# Patient Record
Sex: FEMALE | Race: WHITE | NOT HISPANIC OR LATINO
[De-identification: names, ages, dates, MRNs, and addresses within clinical notes are randomized per-mention and may not be internally consistent; named-entity substitution may affect disease eponyms.]

---

## 2018-08-28 PROBLEM — Z00.00 ENCOUNTER FOR PREVENTIVE HEALTH EXAMINATION: Status: ACTIVE | Noted: 2018-08-28

## 2018-08-29 ENCOUNTER — APPOINTMENT (OUTPATIENT)
Dept: BREAST CENTER | Facility: CLINIC | Age: 53
End: 2018-08-29
Payer: COMMERCIAL

## 2018-08-29 VITALS
BODY MASS INDEX: 40.57 KG/M2 | DIASTOLIC BLOOD PRESSURE: 84 MMHG | SYSTOLIC BLOOD PRESSURE: 138 MMHG | HEIGHT: 63 IN | WEIGHT: 229 LBS

## 2018-08-29 DIAGNOSIS — Z85.3 PERSONAL HISTORY OF MALIGNANT NEOPLASM OF BREAST: ICD-10-CM

## 2018-08-29 DIAGNOSIS — Z78.9 OTHER SPECIFIED HEALTH STATUS: ICD-10-CM

## 2018-08-29 DIAGNOSIS — L03.114 CELLULITIS OF LEFT UPPER LIMB: ICD-10-CM

## 2018-08-29 DIAGNOSIS — Z82.49 FAMILY HISTORY OF ISCHEMIC HEART DISEASE AND OTHER DISEASES OF THE CIRCULATORY SYSTEM: ICD-10-CM

## 2018-08-29 DIAGNOSIS — Z86.711 PERSONAL HISTORY OF PULMONARY EMBOLISM: ICD-10-CM

## 2018-08-29 DIAGNOSIS — Z86.718 PERSONAL HISTORY OF OTHER VENOUS THROMBOSIS AND EMBOLISM: ICD-10-CM

## 2018-08-29 DIAGNOSIS — Z83.3 FAMILY HISTORY OF DIABETES MELLITUS: ICD-10-CM

## 2018-08-29 DIAGNOSIS — N61.0 MASTITIS WITHOUT ABSCESS: ICD-10-CM

## 2018-08-29 DIAGNOSIS — Z80.3 FAMILY HISTORY OF MALIGNANT NEOPLASM OF BREAST: ICD-10-CM

## 2018-08-29 DIAGNOSIS — F17.200 NICOTINE DEPENDENCE, UNSPECIFIED, UNCOMPLICATED: ICD-10-CM

## 2018-08-29 PROCEDURE — 99244 OFF/OP CNSLTJ NEW/EST MOD 40: CPT

## 2018-08-29 RX ORDER — OXYCODONE 5 MG/1
5 TABLET ORAL
Qty: 20 | Refills: 0 | Status: DISCONTINUED | COMMUNITY
Start: 2018-03-15 | End: 2018-08-29

## 2018-08-29 RX ORDER — ANASTROZOLE TABLETS 1 MG/1
1 TABLET ORAL
Refills: 0 | Status: ACTIVE | COMMUNITY

## 2018-08-29 RX ORDER — UBIDECARENONE/VIT E ACET 100MG-5
50 MCG CAPSULE ORAL
Refills: 0 | Status: ACTIVE | COMMUNITY

## 2018-08-29 RX ORDER — WARFARIN SODIUM 2 MG/1
2 TABLET ORAL
Refills: 0 | Status: ACTIVE | COMMUNITY

## 2018-08-29 RX ORDER — WARFARIN 7.5 MG/1
7.5 TABLET ORAL
Qty: 30 | Refills: 0 | Status: DISCONTINUED | COMMUNITY
Start: 2018-04-27 | End: 2018-08-29

## 2018-08-29 RX ORDER — WARFARIN 10 MG/1
10 TABLET ORAL
Qty: 30 | Refills: 0 | Status: DISCONTINUED | COMMUNITY
Start: 2018-04-27 | End: 2018-08-29

## 2018-08-29 RX ORDER — WARFARIN 5 MG/1
5 TABLET ORAL
Qty: 30 | Refills: 0 | Status: DISCONTINUED | COMMUNITY
Start: 2018-03-05 | End: 2018-08-29

## 2018-09-12 DIAGNOSIS — Z13.79 ENCOUNTER FOR OTHER SCREENING FOR GENETIC AND CHROMOSOMAL ANOMALIES: ICD-10-CM

## 2019-03-07 ENCOUNTER — APPOINTMENT (OUTPATIENT)
Dept: BREAST CENTER | Facility: CLINIC | Age: 54
End: 2019-03-07
Payer: COMMERCIAL

## 2019-03-07 VITALS
SYSTOLIC BLOOD PRESSURE: 160 MMHG | WEIGHT: 206 LBS | BODY MASS INDEX: 36.5 KG/M2 | DIASTOLIC BLOOD PRESSURE: 84 MMHG | HEIGHT: 63 IN | HEART RATE: 55 BPM

## 2019-03-07 DIAGNOSIS — R22.30 LOCALIZED SWELLING, MASS AND LUMP, UNSPECIFIED UPPER LIMB: ICD-10-CM

## 2019-03-07 DIAGNOSIS — Z85.3 PERSONAL HISTORY OF MALIGNANT NEOPLASM OF BREAST: ICD-10-CM

## 2019-03-07 PROCEDURE — 99214 OFFICE O/P EST MOD 30 MIN: CPT

## 2019-03-07 NOTE — DATA REVIEWED
[FreeTextEntry1] : COLOR panel test, report 9/30/2018\par - no mutations\par \par Needle bx 10/4/2010\par - infiltrating ductal carcinoma, DCIS, ALH, LCIS, ER 80%, MI 90%, Her2 FISH 1.3\par \par Surg path, result 1/20/2011\par - L breast - 4.2 cm tumor, EIC, Jd 5/9\par - 1/1 SLN up to 1.4 cm with extranodal extension; additional 3/16 (2 with macromet, 1 with micromet); total 4/17 \par \par R mammogram 5/14/2018\par - predominantly fatty\par - retroglandular silicone implant\par - BIRADS 1\par \par R complete US 5/14/2018\par - BIRADS 1\par \par L breast US 9/4/2018\par - 2.5 x 1.4 x 0.9 cm anechoic structure at area of concern in L breast 3:00 N11, possible fat necrosis or seroma\par - adjacent 3.2 x 3.1 cm heterogenous finding with shadowing at area of concern in L breast 3:00 N11, possible fat necrosis or scar tissue \par - vague heterogenous finding at area of concern in L breast 3:00 N10, possible scar tissue\par - no axillary lymphadenopathy\par - BIRADS 0; breast MRI recommended\par \par Breast MRI 9/7/2018\par - 0.9 and 0.7 cm adjacent enhancing masses in R posterior central and lower breast, likely stable from 2016; 6 mo MRI recommended\par - R implant intact\par - 4.1 x 3.3 cm fat necrosis at area of concern in L breast 3:00\par - no axillary adenopathy\par - BIRADS 3

## 2019-03-07 NOTE — CONSULT LETTER
[Dear  ___] : Dear  [unfilled], [Courtesy Letter:] : I had the pleasure of seeing your patient, [unfilled], in my office today. [Please see my note below.] : Please see my note below. [Consult Closing:] : Thank you very much for allowing me to participate in the care of this patient.  If you have any questions, please do not hesitate to contact me. [Sincerely,] : Sincerely, [FreeTextEntry3] : Madelin Reed MD  [DrJordon  ___] : Dr. LEES

## 2019-03-07 NOTE — PAST MEDICAL HISTORY
[Menarche Age ____] : age at menarche was [unfilled] [Total Preg ___] : G[unfilled] [Live Births ___] : P[unfilled]  [Age At Live Birth ___] : Age at live birth: [unfilled] [History of Hormone Replacement Treatment] : has no history of hormone replacement treatment [de-identified] : No menses after chemo at age 46. [FreeTextEntry7] : 20 yr [FreeTextEntry8] : no

## 2019-03-07 NOTE — HISTORY OF PRESENT ILLNESS
[FreeTextEntry1] : Ms. Goins is a 54 year old woman who presents for a follow-up for a right axillary lump, and abnormal findings in her right breast on MRI. Her breast history is significant for:\par \par 1. Left breast infiltrating ductal carcinoma diagnosed at age 45 in 2010, pathologic stage III A, pT2 N2a, Jd 5/9, ER 80%, CO 90%, Her2 FISH 1.3\par - s/p L mastectomy, sentinel node biopsy, axillary node dissection (Dr. Zimmer, 1/2011) with TRAM reconstruction = 4.2 cm tumor, 4/17 LN up to 1.4 cm with extranodal extension\par - s/p adjuvant chemotherapy\par - s/p adjuvant radiation\par - tamoxifen x 5 yr, then had PE / DVT and switched to anastrazole which she is currently on\par \par 2. BRCA testing and genetic panel testing are negative. \par \par 3. History of b/l breast reduction prior to her L breast cancer, then a right augmentation with implant after her L mastectomy \par \par 4. Left arm lymphedema \par \par A lump in the far lateral left reconstructed breast was palpated at the last visit, and left ultrasound on 9/4/2018 suggested fat necrosis, seroma or scar tissue, and MRI on 9/7/2018 showed stable fat necrosis at the area of concern, and also two adjacent enhancing nodules in the right breast. She has no breast complaints. No palpable breast or axillary lumps, nipple discharge, or skin changes. The right axillary dermal lump disappeared altogether. She met a Plastic Surgeon regarding her left arm lymphedema and was told she had to lose 100 points; she has lost 57 pounds so far, and plans to lose 40 more. \par \par Her family history is significant for breast cancer in two paternal cousins, both at 41. One of the cousins is the daughter of an affected paternal aunt with appendiceal cancer, and had BRCA testing which was negative. The other paternal cousin is the daughter of a different paternal aunt.

## 2019-03-07 NOTE — PHYSICAL EXAM
[Normocephalic] : normocephalic [Atraumatic] : atraumatic [EOMI] : extra ocular movement intact [Sclera nonicteric] : sclera nonicteric [Supple] : supple [No Supraclavicular Adenopathy] : no supraclavicular adenopathy [No Cervical Adenopathy] : no cervical adenopathy [Clear to Auscultation Bilat] : clear to auscultation bilaterally [Normal Sinus Rhythm] : normal sinus rhythm [No Rubs] : no pericardial rub [Examined in the supine and seated position] : examined in the supine and seated position [Asymmetrical] : asymmetrical [Bra Size: ___] : Bra Size: [unfilled] [No dominant masses] : no dominant masses in right breast  [No Nipple Retraction] : no left nipple retraction [No Nipple Discharge] : no left nipple discharge [No Axillary Lymphadenopathy] : no left axillary lymphadenopathy [Soft] : abdomen soft [Not Tender] : non-tender [No Edema] : no edema [No Rashes] : no rashes [No Ulceration] : no ulceration [de-identified] : R >> L [de-identified] : Circumareolar scar and inframammary scar. Implant.  [de-identified] : Post-radiation changes. Keyhole-shaped TRAM flap skin paddle. Lump in far medial aspect.  [de-identified] : MARILIN in sleeve for lymphedema

## 2020-08-24 ENCOUNTER — APPOINTMENT (OUTPATIENT)
Dept: BREAST CENTER | Facility: CLINIC | Age: 55
End: 2020-08-24

## 2020-08-28 ENCOUNTER — APPOINTMENT (OUTPATIENT)
Dept: BREAST CENTER | Facility: CLINIC | Age: 55
End: 2020-08-28
Payer: COMMERCIAL

## 2020-08-28 VITALS
HEART RATE: 64 BPM | HEIGHT: 63 IN | SYSTOLIC BLOOD PRESSURE: 112 MMHG | BODY MASS INDEX: 32.43 KG/M2 | DIASTOLIC BLOOD PRESSURE: 76 MMHG | WEIGHT: 183 LBS

## 2020-08-28 DIAGNOSIS — N63.20 UNSPECIFIED LUMP IN THE LEFT BREAST, UNSPECIFIED QUADRANT: ICD-10-CM

## 2020-08-28 PROCEDURE — 99213 OFFICE O/P EST LOW 20 MIN: CPT

## 2020-08-28 NOTE — HISTORY OF PRESENT ILLNESS
[FreeTextEntry1] : Ms. Goins is a 54 year old woman last seen in the office approximately 18 months ago for f/u for a right axillary lump, and abnormal findings in her right breast on MRI. Her breast history is significant for:\par \par 1. Left breast infiltrating ductal carcinoma diagnosed at age 45 in 2010, pathologic stage III A, pT2 N2a, Jd 5/9, ER 80%, TN 90%, Her2 FISH 1.3\par - s/p L mastectomy, sentinel node biopsy, axillary node dissection (Dr. Zimmer, 1/2011) with TRAM reconstruction = 4.2 cm tumor, 4/17 LN up to 1.4 cm with extranodal extension\par - s/p adjuvant chemotherapy\par - s/p adjuvant radiation\par - tamoxifen x 5 yr, then had PE / DVT and switched to anastrazole which she is currently on\par \par 2. BRCA testing and genetic panel testing are negative. \par \par 3. History of b/l breast reduction prior to her L breast cancer, then a right augmentation with implant after her L mastectomy \par \par 4. Left arm lymphedema \par \par She is here today with c/o possible  worsening of lymphedema in back "it feels a bit funny" and possible worsening of lymphedema in LUE. She also c/o possible increase in size of previously palpated hardened lump in left lateral reconstructed breast. She has met with a  plastic surgeon regarding her left arm lymphedema and was told she had to lose 100 pounds prior to surgery  she has lost 80 pounds thus far, and had noted some improvement in upper extremity lymphedema.  She does note that she has not been using pump or performing her home exercises for the treatment of lymphedema.   Prior to her weight loss she notes multiple episodes of cellulitis in LUE which has not recurred .\par \par She has been wearing compression sleeve but notes that it has been some time since she has obtained a new sleeve.  She also has not been seen by physical therapy for treatment of her lymphedema in some time.  \par \par she denies fevers , chills , redness of skin on UE or any other signs of infection.\par \par

## 2020-08-28 NOTE — PAST MEDICAL HISTORY
[Menarche Age ____] : age at menarche was [unfilled] [Total Preg ___] : G[unfilled] [Live Births ___] : P[unfilled]  [Age At Live Birth ___] : Age at live birth: [unfilled] [History of Hormone Replacement Treatment] : has no history of hormone replacement treatment [FreeTextEntry8] : no [de-identified] : No menses after chemo at age 46. [FreeTextEntry7] : 20 yr

## 2020-08-28 NOTE — PHYSICAL EXAM
[Sclera nonicteric] : sclera nonicteric [Normocephalic] : normocephalic [Atraumatic] : atraumatic [No Axillary Lymphadenopathy] : no left axillary lymphadenopathy [Asymmetrical] : asymmetrical [No Edema] : no edema [No Rashes] : no rashes [de-identified] : compression sleeve in place, + lymphadema [de-identified] : r [de-identified] : post - radiation changes , hardened lump in left lateral aspect of breast -

## 2020-12-02 ENCOUNTER — APPOINTMENT (OUTPATIENT)
Dept: BREAST CENTER | Facility: CLINIC | Age: 55
End: 2020-12-02

## 2021-01-08 ENCOUNTER — APPOINTMENT (OUTPATIENT)
Dept: BREAST CENTER | Facility: CLINIC | Age: 56
End: 2021-01-08
Payer: COMMERCIAL

## 2021-01-08 VITALS
BODY MASS INDEX: 33.66 KG/M2 | OXYGEN SATURATION: 99 % | SYSTOLIC BLOOD PRESSURE: 144 MMHG | HEIGHT: 63 IN | WEIGHT: 190 LBS | DIASTOLIC BLOOD PRESSURE: 87 MMHG | HEART RATE: 56 BPM

## 2021-01-08 DIAGNOSIS — N64.89 OTHER SPECIFIED DISORDERS OF BREAST: ICD-10-CM

## 2021-01-08 DIAGNOSIS — R92.8 OTHER ABNORMAL AND INCONCLUSIVE FINDINGS ON DIAGNOSTIC IMAGING OF BREAST: ICD-10-CM

## 2021-01-08 DIAGNOSIS — Z12.39 ENCOUNTER FOR OTHER SCREENING FOR MALIGNANT NEOPLASM OF BREAST: ICD-10-CM

## 2021-01-08 DIAGNOSIS — Z08 ENCOUNTER FOR FOLLOW-UP EXAMINATION AFTER COMPLETED TREATMENT FOR MALIGNANT NEOPLASM: ICD-10-CM

## 2021-01-08 DIAGNOSIS — I89.0 LYMPHEDEMA, NOT ELSEWHERE CLASSIFIED: ICD-10-CM

## 2021-01-08 DIAGNOSIS — Z85.3 ENCOUNTER FOR FOLLOW-UP EXAMINATION AFTER COMPLETED TREATMENT FOR MALIGNANT NEOPLASM: ICD-10-CM

## 2021-01-08 PROCEDURE — 99072 ADDL SUPL MATRL&STAF TM PHE: CPT

## 2021-01-08 PROCEDURE — 99214 OFFICE O/P EST MOD 30 MIN: CPT

## 2021-01-08 NOTE — DATA REVIEWED
[FreeTextEntry1] : COLOR panel test, report 9/30/2018\par - no mutations\par \par Needle bx 10/4/2010\par - infiltrating ductal carcinoma, DCIS, ALH, LCIS, ER 80%, SC 90%, Her2 FISH 1.3\par \par Surg path, result 1/20/2011\par - L breast - 4.2 cm tumor, EIC, Jd 5/9\par - 1/1 SLN up to 1.4 cm with extranodal extension; additional 3/16 (2 with macromet, 1 with micromet); total 4/17 \par \par Breast MRI 4/25/2019\par - intact R implant\par - postsurgical changes\par - low density in R central lower posterior breast next to implant, unchanged, favoring benign etiology\par - BIRADS 2\par \par R mammogram 5/18/20\par - predominantly fatty\par - BIRADS 1\par \par R complete US 5/18/20\par - BIRADS 1\par \par L complete US 12/10/20\par - 3.9 x 2.7 x 3.5 cm shadowing calcification in L breast 3:00 N9 at area of concern, likely from fat necrosis\par - 0.8 x 1.2 x 1 cm shadowing calcification in L breast 6:00 N6, likely from fat necrosis\par - BIRADS 2

## 2021-01-08 NOTE — HISTORY OF PRESENT ILLNESS
[FreeTextEntry1] : Ms. Goins is a 55 year old woman who presents for a follow-up for surveillance for left breast cancer. Her breast history is significant for:\par \par 1. Left breast infiltrating ductal carcinoma diagnosed at age 45 in 2010, pathologic stage III A, pT2 N2a, [place] 5/9, ER 80%, [place] 90%, Her2 FISH 1.3\par - s/p L mastectomy, sentinel node biopsy, axillary node dissection (Dr. Zimmer, 1/2011) with TRAM reconstruction = 4.2 cm tumor, 4/17 LN up to 1.4 cm with extranodal extension\par - s/p adjuvant chemotherapy\par - s/p adjuvant radiation\par - tamoxifen x 5 yr, then had PE / DVT and switched to anastrazole which she is currently on\par \par 2. History of b/l breast reduction prior to her L breast cancer, then a right augmentation with implant after her L mastectomy \par \par 3. Left arm lymphedema \par \par She is doing well. The lump of fat necrosis in the left lateral breast is the same. No other lumps. No nipple discharge or skin changes. She lost 70 pounds from her weight in 2019 and hopes to lose another 30. \par \par Her BRCA and genetic panel testing are negative. Her family history is significant for breast cancer in two paternal cousins, both at 41. One of the cousins is the daughter of an affected paternal aunt with appendiceal cancer, and had BRCA testing which was negative. The other paternal cousin is the daughter of a different paternal aunt.

## 2021-01-08 NOTE — PHYSICAL EXAM
[Normocephalic] : normocephalic [Atraumatic] : atraumatic [EOMI] : extra ocular movement intact [Sclera nonicteric] : sclera nonicteric [Supple] : supple [No Supraclavicular Adenopathy] : no supraclavicular adenopathy [No Cervical Adenopathy] : no cervical adenopathy [Clear to Auscultation Bilat] : clear to auscultation bilaterally [Normal Sinus Rhythm] : normal sinus rhythm [No Rubs] : no pericardial rub [Examined in the supine and seated position] : examined in the supine and seated position [Asymmetrical] : asymmetrical [Bra Size: ___] : Bra Size: [unfilled] [No dominant masses] : no dominant masses in right breast  [No Nipple Retraction] : no left nipple retraction [No Nipple Discharge] : no left nipple discharge [No Axillary Lymphadenopathy] : no left axillary lymphadenopathy [Soft] : abdomen soft [Not Tender] : non-tender [No Edema] : no edema [No Rashes] : no rashes [No Ulceration] : no ulceration [de-identified] : R >> L [de-identified] : Circumareolar scar and inframammary scar. Implant in place [de-identified] : Post-radiation changes. Keyhole-shaped TRAM flap skin paddle. Hard lump in far lateral aspect at area of fat necrosis [de-identified] : MARILIN in sleeve for lymphedema

## 2021-01-08 NOTE — CONSULT LETTER
[Dear  ___] : Dear  [unfilled], [Courtesy Letter:] : I had the pleasure of seeing your patient, [unfilled], in my office today. [Please see my note below.] : Please see my note below. [Consult Closing:] : Thank you very much for allowing me to participate in the care of this patient.  If you have any questions, please do not hesitate to contact me. [Sincerely,] : Sincerely, [DrJordon  ___] : Dr. LEES [FreeTextEntry3] : Madelin Reed MD

## 2021-01-08 NOTE — PAST MEDICAL HISTORY
[Menarche Age ____] : age at menarche was [unfilled] [Total Preg ___] : G[unfilled] [Live Births ___] : P[unfilled]  [Age At Live Birth ___] : Age at live birth: [unfilled] [History of Hormone Replacement Treatment] : has no history of hormone replacement treatment [de-identified] : No menses after chemo at age 46. [FreeTextEntry7] : 20 yr [FreeTextEntry8] : no

## 2021-01-30 ENCOUNTER — EMERGENCY (EMERGENCY)
Facility: HOSPITAL | Age: 56
LOS: 1 days | Discharge: HOME | End: 2021-01-30
Admitting: EMERGENCY MEDICINE
Payer: COMMERCIAL

## 2021-01-30 VITALS
DIASTOLIC BLOOD PRESSURE: 98 MMHG | OXYGEN SATURATION: 98 % | RESPIRATION RATE: 16 BRPM | HEART RATE: 71 BPM | TEMPERATURE: 98 F | SYSTOLIC BLOOD PRESSURE: 147 MMHG

## 2021-01-30 PROCEDURE — U0005: CPT

## 2021-01-30 PROCEDURE — 99283 EMERGENCY DEPT VISIT LOW MDM: CPT

## 2021-01-30 PROCEDURE — 99282 EMERGENCY DEPT VISIT SF MDM: CPT

## 2021-01-30 PROCEDURE — U0003: CPT

## 2021-01-30 NOTE — ED ADULT TRIAGE NOTE - CHIEF COMPLAINT QUOTE
pt. states 2 of her colleagues tested covid this week, pt. presently denies symptoms at the present time.

## 2021-01-30 NOTE — ED PROVIDER NOTE - CARDIAC, MLM
Normal rate, regular rhythm.  Heart sounds S1, S2.  No murmurs, rubs or gallops.
Principal Discharge DX:	Muscle spasm

## 2021-01-30 NOTE — ED PROVIDER NOTE - OBJECTIVE STATEMENT
54 y/o F presents to ED for COVID testing. Pt currently asymptomatic. No chest pain, sob, cough, abd pain, n/v/d, headache, dizziness. + sick contacts at work. No recent travel. Pt well appearing. NAD.

## 2021-01-30 NOTE — ED PROVIDER NOTE - PATIENT PORTAL LINK FT
You can access the FollowMyHealth Patient Portal offered by [facility] by registering at the following website: [email or web address]. By joining [employer]’s FollowMyHealth portal, you will also be able to view your health information using other applications (apps) compatible with our system.

## 2021-02-10 DIAGNOSIS — Z90.12 ACQUIRED ABSENCE OF LEFT BREAST AND NIPPLE: ICD-10-CM

## 2021-03-23 ENCOUNTER — EMERGENCY (EMERGENCY)
Facility: HOSPITAL | Age: 56
LOS: 1 days | End: 2021-03-23
Payer: COMMERCIAL

## 2021-03-23 VITALS
RESPIRATION RATE: 20 BRPM | SYSTOLIC BLOOD PRESSURE: 170 MMHG | OXYGEN SATURATION: 97 % | HEART RATE: 62 BPM | DIASTOLIC BLOOD PRESSURE: 90 MMHG | TEMPERATURE: 98 F

## 2021-03-23 PROBLEM — Z78.9 OTHER SPECIFIED HEALTH STATUS: Chronic | Status: ACTIVE | Noted: 2021-01-30

## 2021-03-23 LAB — SARS-COV-2 RNA SPEC QL NAA+PROBE: SIGNIFICANT CHANGE UP

## 2021-03-23 PROCEDURE — 99283 EMERGENCY DEPT VISIT LOW MDM: CPT

## 2021-03-23 PROCEDURE — U0005: CPT

## 2021-03-23 PROCEDURE — 99282 EMERGENCY DEPT VISIT SF MDM: CPT

## 2021-03-23 PROCEDURE — U0003: CPT

## 2021-03-23 NOTE — ED PROVIDER NOTE - PATIENT PORTAL LINK FT
You can access the FollowMyHealth Patient Portal offered by [facility] by registering at the following website: [email or web address]. By joining [employer]’s FollowMyHealth portal, you will also be able to view your health information using other applications (apps) compatible with our system.

## 2021-03-23 NOTE — ED PROVIDER NOTE - OBJECTIVE STATEMENT
55yo F presenting for covid testing. Pt had positive exposure at work. Pt feeling well, no symptoms. Denies fever, chills, cough, sob, cp, body aches, loss of smell/taste.